# Patient Record
Sex: MALE | Race: WHITE | NOT HISPANIC OR LATINO
[De-identification: names, ages, dates, MRNs, and addresses within clinical notes are randomized per-mention and may not be internally consistent; named-entity substitution may affect disease eponyms.]

---

## 2018-12-28 PROBLEM — Z00.00 ENCOUNTER FOR PREVENTIVE HEALTH EXAMINATION: Status: ACTIVE | Noted: 2018-12-28

## 2018-12-31 ENCOUNTER — APPOINTMENT (OUTPATIENT)
Dept: INTERNAL MEDICINE | Facility: CLINIC | Age: 43
End: 2018-12-31
Payer: OTHER GOVERNMENT

## 2018-12-31 VITALS
TEMPERATURE: 98 F | HEART RATE: 84 BPM | HEIGHT: 70 IN | DIASTOLIC BLOOD PRESSURE: 68 MMHG | OXYGEN SATURATION: 93 % | WEIGHT: 234 LBS | SYSTOLIC BLOOD PRESSURE: 114 MMHG | BODY MASS INDEX: 33.5 KG/M2

## 2018-12-31 DIAGNOSIS — Z82.0 FAMILY HISTORY OF EPILEPSY AND OTHER DISEASES OF THE NERVOUS SYSTEM: ICD-10-CM

## 2018-12-31 DIAGNOSIS — G47.33 OBSTRUCTIVE SLEEP APNEA (ADULT) (PEDIATRIC): ICD-10-CM

## 2018-12-31 DIAGNOSIS — Z78.9 OTHER SPECIFIED HEALTH STATUS: ICD-10-CM

## 2018-12-31 DIAGNOSIS — Z87.891 PERSONAL HISTORY OF NICOTINE DEPENDENCE: ICD-10-CM

## 2018-12-31 DIAGNOSIS — Z80.0 FAMILY HISTORY OF MALIGNANT NEOPLASM OF DIGESTIVE ORGANS: ICD-10-CM

## 2018-12-31 PROCEDURE — 99204 OFFICE O/P NEW MOD 45 MIN: CPT

## 2018-12-31 RX ORDER — ESOMEPRAZOLE MAGNESIUM 5 MG/1
GRANULE, DELAYED RELEASE ORAL
Refills: 0 | Status: ACTIVE | COMMUNITY

## 2018-12-31 NOTE — PHYSICAL EXAM

## 2018-12-31 NOTE — HISTORY OF PRESENT ILLNESS
[FreeTextEntry1] : Evaluation for sleep disorder [de-identified] : This is a 43-year-old white male who for the past 3-4 years complains of very loud snoring. His wife takes him sleep in a different room because of the loud snoring. It is worse when he is on his back. It's unclear if he has witnessed apneas. His bedtime is between 10:30 and 11 he awakens at 5:30 in the morning. He doesn't feel completely rested during the day and has to drink 2-3 cups of coffee to stay awake. He has never had a sleep study. His [place] sleepiness score is 8. There is no history of heart disease hypertension or diabetes. He works as an [employer]. He is currently going for a PhD in statistics. He rarely uses alcohol. He has gained 25 pounds over the past 3 years. He is currently 5 feet 10 weight 235.

## 2018-12-31 NOTE — ASSESSMENT
[FreeTextEntry1] : Patient with history of very loud snoring. I feel we should rule out significant sleep apnea. I advised the patient to lose weight. We'll arrange for an in lab sleep study.

## 2019-01-14 ENCOUNTER — TRANSCRIPTION ENCOUNTER (OUTPATIENT)
Age: 44
End: 2019-01-14

## 2023-08-18 ENCOUNTER — TRANSCRIBE ORDERS (OUTPATIENT)
Facility: HOSPITAL | Age: 48
End: 2023-08-18

## 2023-08-18 DIAGNOSIS — M25.532 ACUTE PAIN OF BOTH WRISTS: Primary | ICD-10-CM

## 2023-08-18 DIAGNOSIS — M25.531 ACUTE PAIN OF BOTH WRISTS: Primary | ICD-10-CM

## 2023-08-24 ENCOUNTER — HOSPITAL ENCOUNTER (OUTPATIENT)
Facility: HOSPITAL | Age: 48
Discharge: HOME | End: 2023-08-24
Attending: ORTHOPAEDIC SURGERY
Payer: OTHER GOVERNMENT

## 2023-08-24 ENCOUNTER — HOSPITAL ENCOUNTER (OUTPATIENT)
Facility: HOSPITAL | Age: 48
End: 2023-08-24
Attending: ORTHOPAEDIC SURGERY
Payer: OTHER GOVERNMENT

## 2023-08-24 DIAGNOSIS — M25.532 ACUTE PAIN OF BOTH WRISTS: ICD-10-CM

## 2023-08-24 DIAGNOSIS — M25.531 ACUTE PAIN OF BOTH WRISTS: ICD-10-CM

## 2023-08-24 PROCEDURE — 73221 MRI JOINT UPR EXTREM W/O DYE: CPT
